# Patient Record
Sex: FEMALE | Race: WHITE
[De-identification: names, ages, dates, MRNs, and addresses within clinical notes are randomized per-mention and may not be internally consistent; named-entity substitution may affect disease eponyms.]

---

## 2017-03-15 ENCOUNTER — HOSPITAL ENCOUNTER (OUTPATIENT)
Dept: HOSPITAL 80 - CIMAGING | Age: 67
End: 2017-03-15
Attending: GENERAL ACUTE CARE HOSPITAL
Payer: COMMERCIAL

## 2017-03-15 DIAGNOSIS — Z12.31: Primary | ICD-10-CM

## 2017-03-15 PROCEDURE — G0202 SCR MAMMO BI INCL CAD: HCPCS

## 2017-03-29 ENCOUNTER — HOSPITAL ENCOUNTER (OUTPATIENT)
Dept: HOSPITAL 80 - CIMAGING | Age: 67
End: 2017-03-29
Attending: GENERAL ACUTE CARE HOSPITAL
Payer: COMMERCIAL

## 2017-03-29 DIAGNOSIS — R92.2: ICD-10-CM

## 2017-03-29 DIAGNOSIS — Z12.39: Primary | ICD-10-CM

## 2017-03-29 PROCEDURE — G0206 DX MAMMO INCL CAD UNI: HCPCS

## 2018-07-30 ENCOUNTER — HOSPITAL ENCOUNTER (OUTPATIENT)
Dept: HOSPITAL 80 - CIMAGING | Age: 68
End: 2018-07-30
Attending: INTERNAL MEDICINE
Payer: COMMERCIAL

## 2018-07-30 DIAGNOSIS — R92.8: Primary | ICD-10-CM

## 2018-08-28 ENCOUNTER — HOSPITAL ENCOUNTER (OUTPATIENT)
Dept: HOSPITAL 80 - FIMAGING | Age: 68
Discharge: HOME | End: 2018-08-28
Attending: INTERNAL MEDICINE
Payer: COMMERCIAL

## 2018-08-28 DIAGNOSIS — N60.82: Primary | ICD-10-CM

## 2018-08-28 PROCEDURE — 0HBU3ZX EXCISION OF LEFT BREAST, PERCUTANEOUS APPROACH, DIAGNOSTIC: ICD-10-PCS | Performed by: RADIOLOGY

## 2018-09-28 ENCOUNTER — HOSPITAL ENCOUNTER (OUTPATIENT)
Dept: HOSPITAL 80 - FIMAGING | Age: 68
Discharge: HOME | End: 2018-09-28
Attending: SURGERY
Payer: COMMERCIAL

## 2018-09-28 VITALS — DIASTOLIC BLOOD PRESSURE: 61 MMHG | SYSTOLIC BLOOD PRESSURE: 109 MMHG

## 2018-09-28 DIAGNOSIS — N60.92: Primary | ICD-10-CM

## 2018-09-28 DIAGNOSIS — Z80.3: ICD-10-CM

## 2018-09-28 DIAGNOSIS — M51.86: ICD-10-CM

## 2018-09-28 DIAGNOSIS — E03.9: ICD-10-CM

## 2018-09-28 PROCEDURE — BH01ZZZ PLAIN RADIOGRAPHY OF LEFT BREAST: ICD-10-PCS | Performed by: RADIOLOGY

## 2018-09-28 PROCEDURE — 0HBU0ZZ EXCISION OF LEFT BREAST, OPEN APPROACH: ICD-10-PCS | Performed by: SURGERY

## 2018-09-28 NOTE — POSTANESTH
Post Anesthetic Evaluation


Cardiovascular Status: Normal, Stable


Respiratory Status: Normal, Stable


Level of Consciousness/Mental Status: Mildly Sleepy, Arousable


Pain Control: Adequate, Prn Tx Ordered


Nausea/Vomiting Control: Adequate, Prn Tx Ordered


Complications Possibly Related to Anesthesia: None Noted

## 2018-09-28 NOTE — PDANEPAE
ANE History of Present Illness





left breast mass for bx





ANE Past Medical History





- Cardiovascular History


Hx Hypertension: No


Hx Arrhythmias: No


Hx Chest Pain: No


Hx Coronary Artery / Peripheral Vascular Disease: No


Hx CHF / Valvular Disease: No


Hx Palpitations: No





- Pulmonary History


Hx COPD: No


Hx Asthma/Reactive Airway Disease: No


Hx Recent Upper Respiratory Infection: No


Hx Oxygen in Use at Home: No


Hx Sleep Apnea: No


Sleep Apnea Screening Result - Last Documented: Negative





- Neurologic History


Hx Cerebrovascular Accident: No


Hx Seizures: No


Hx Dementia: No





- Endocrine History


Hx Diabetes: No


Endocrine History Comment: hypothyroid





- Renal History


Hx Renal Disorders: No





- Liver History


Hx Hepatic Disorders: No





- Neurological & Psychiatric Hx


Hx Neurological and Psychiatric Disorders: No





- Cancer History


Hx Cancer: No





- Congenital Disorder History


Hx Congenital Disorders: No





- GI History


Hx Gastrointestinal Disorders: No





- Other Health History


Other Health History: wears glasses.  non-cancerous, calcified nodules in left 

breast.  dry eye syndrome





- Chronic Pain History


Chronic Pain: No





- Surgical History


Prior Surgeries: needle bx x2.  tonsillectomy.  ovarian cyst, ovary removal.  

thumb surgery





ANE Review of Systems


Review of systems is: negative


Review of Systems: 








- Exercise capacity


METS (RN): 4 METS





ANE Patient History





- Allergies


Allergies/Adverse Reactions: 








No Known Allergies Allergy (Verified 09/26/18 14:35)


 








- Home Medications


Home medications: home medication list seen and reviewed


Home Medications: 








Adult One Daily Multivit Tab  09/26/18 [Last Taken Unknown]


Aspirin 81mg (*)  09/26/18 [Last Taken Unknown]


Calcium 500 + Vit D Caplet  09/26/18 [Last Taken Unknown]


Fish Oil 1,000 mg Softgel  09/26/18 [Last Taken Unknown]


Glucosamine  09/26/18 [Last Taken Unknown]


Klonopin PRN 09/26/18 [Last Taken Unknown]


Levothyroxine  09/26/18 [Last Taken Unknown]


Vitamin B Complex  09/26/18 [Last Taken Unknown]








- NPO status


NPO Since - Liquids (Date): 09/28/18


NPO Since - Liquids (Time): 05:00


NPO Since - Solids (Date): 09/27/18


NPO Since - Solids (Time): 19:30





- Smoking Hx


Smoking Status: Former smoker





- Family Anes Hx


Family Hx Anesthesia Complications: none





ANE Labs/Vital Signs





- Vital Signs


Blood Pressure: 145/49


Heart Rate: 62


Respiratory Rate: 14


O2 Sat (%): 97


Height: 162.56 cm


Weight: 58.513 kg





ANE Physical Exam





- Airway


Neck exam: FROM


Mallampati Score: Class 2


Mouth exam: normal dental/mouth exam





- Pulmonary


Pulmonary: no respiratory distress





- Cardiovascular


Cardiovascular: regular rate and rhythym





- ASA Status


ASA Status: II





ANE Anesthesia Plan


Anesthesia Plan: GA with mask

## 2018-09-28 NOTE — POSTOPPROG
Post Op Note


Date of Operation: 09/28/18


Surgeon: Blanca Duncan


Assistant: hu


Anesthesiologist: kenny


Anesthesia: IV Sedation


Pre-op Diagnosis: L breast adh


Post-op Diagnosis: Same


Indication: 68 yo with l breast adh


Procedure: L needle localized excisional biopsy


Inf/Abcess present in the surg proc area at time of surgery?: No


Depth: Superfical  (Skin SQ)


EBL: Minimal


Specimen(s): 





l breast tissue

## 2018-09-28 NOTE — GOP
DATE OF OPERATION:  09/28/2018



SURGEON:  Blanca Duncan MD



ASSISTANT:  Italia Stovall, MARGE.



ANESTHESIA:  Andrea Trammell M.D./monitored anesthesia care with IV sedation.



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 
hyperplasia.



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 
hyperplasia.



PROCEDURE PERFORMED:  Left breast needle-localized lumpectomy.



FINDINGS:  old biopsy cavity



SPECIMENS:  Left breast tissue.



ESTIMATED BLOOD LOSS:  5 cc.



INDICATIONS:  This patient is a 67-year-old woman who was diagnosed with 
atypical ductal hyperplasia.  Excisional biopsy was indicated to prove that 
there was not a surrounding malignancy.



DESCRIPTION OF PROCEDURE:  Patient was brought into the operating room, placed 
supine on the table.  Monitored anesthesia care with IV sedation was performed.
  Her left breast was prepped and draped in the usual sterile fashion.  I 
infiltrated the area with 10 cc of 0.5% Marcaine mixed with 1% lidocaine and 
made an inframammary incision around the wire.  I created superior and inferior 
skin flaps.  I dissected down beyond the level of the wire.  This was submitted 
to Radiology and then to Pathology for permanent.  Hemostasis was achieved in 
the wound.  The deep layer was closed with 3-0 Vicryl.  Skin closed with 3-0 
Vicryl followed by 4-0 Monocryl.  Mastisol, Steri-Strips, and a sterile 
dressing were applied.  She was awakened in the operating room, extubated and 
transferred to PACU in stable condition.





Job #:  [id number]

MTDD

## 2018-11-14 ENCOUNTER — HOSPITAL ENCOUNTER (OUTPATIENT)
Dept: HOSPITAL 80 - CIMAGING | Age: 68
End: 2018-11-14
Attending: PHYSICAL MEDICINE & REHABILITATION
Payer: COMMERCIAL

## 2018-11-14 DIAGNOSIS — M54.32: Primary | ICD-10-CM

## 2019-06-07 ENCOUNTER — HOSPITAL ENCOUNTER (OUTPATIENT)
Dept: HOSPITAL 80 - FIMAGING | Age: 69
End: 2019-06-07
Payer: COMMERCIAL

## 2021-06-22 NOTE — PDHPUP
History & Physical Update


H&P update statement: 


This history and physical update is based on an assessment of the patient which 

was completed after admission or registration (within 24 hours), but prior to 

the surgery/procedure.





H&P update: H&P reviewed & patient examined, no change in patient's condition 

since H&P completed Left message to try and get more information from parent.